# Patient Record
Sex: MALE | Race: WHITE | NOT HISPANIC OR LATINO | Employment: OTHER | URBAN - METROPOLITAN AREA
[De-identification: names, ages, dates, MRNs, and addresses within clinical notes are randomized per-mention and may not be internally consistent; named-entity substitution may affect disease eponyms.]

---

## 2022-07-25 DIAGNOSIS — Z12.11 SCREENING FOR COLON CANCER: Primary | ICD-10-CM

## 2022-09-12 RX ORDER — TAMSULOSIN HYDROCHLORIDE 0.4 MG/1
1 CAPSULE ORAL DAILY
COMMUNITY
Start: 2022-05-04

## 2022-09-12 RX ORDER — LISINOPRIL 20 MG/1
20 TABLET ORAL DAILY
COMMUNITY
Start: 2022-04-18

## 2022-09-12 RX ORDER — ESOMEPRAZOLE MAGNESIUM 40 MG/1
20 CAPSULE, DELAYED RELEASE ORAL
COMMUNITY

## 2022-09-12 RX ORDER — METFORMIN HYDROCHLORIDE 500 MG/1
TABLET ORAL
COMMUNITY
Start: 2022-06-26 | End: 2022-09-13

## 2022-09-12 RX ORDER — ATORVASTATIN CALCIUM 80 MG/1
80 TABLET, FILM COATED ORAL NIGHTLY
COMMUNITY
Start: 2022-04-13

## 2022-09-12 RX ORDER — ESCITALOPRAM OXALATE 10 MG/1
10 TABLET ORAL DAILY
COMMUNITY
Start: 2022-03-25

## 2022-09-12 NOTE — PROGRESS NOTES
Clinic Note    Reason for visit:  The primary encounter diagnosis was History of colon polyps. Diagnoses of Screening for colon cancer and Gastroesophageal reflux disease, unspecified whether esophagitis present were also pertinent to this visit.    PCP: Kevin [address] / [place] [id number]    HPI:  This is a 67 y.o. male who is here to establish care. Patient has h/o colon polyps and GERD, on esomeprazole 20 OTC daily, which controls reflux. Denies any dysphagia, diarrhea, constipation, abdominal pain, or blood in stool.     Patient had h/o renal cell carcinoma and had right kidney removed in 2022. In referral notes, it states patient's GFR in 30's.     Outside records reviewed:   Colonoscopy 9/30/2008 with Dr. Posada: polyp in sigmoid, repeat in 3 yr    Review of Systems   Constitutional:  Negative for chills, diaphoresis, fatigue, fever and unexpected weight change.   HENT:  Negative for hearing loss, mouth sores, nosebleeds, postnasal drip, sore throat, trouble swallowing and voice change.    Eyes:  Negative for pain, discharge and eye dryness.   Respiratory:  Negative for apnea, cough, choking, chest tightness, shortness of breath and wheezing.    Cardiovascular:  Negative for chest pain, palpitations, leg swelling and claudication.   Gastrointestinal:  Negative for abdominal distention, abdominal pain, anal bleeding, blood in stool, change in bowel habit, constipation, diarrhea, nausea, rectal pain, vomiting, reflux, fecal incontinence and change in bowel habit.   Genitourinary:  Negative for bladder incontinence, difficulty urinating, dysuria, flank pain, frequency and hematuria.   Musculoskeletal:  Positive for back pain. Negative for arthralgias, joint swelling and joint deformity.   Integumentary:  Negative for color change, rash and wound.   Allergic/Immunologic: Negative for environmental allergies and food allergies.   Neurological:  Negative for seizures, facial  asymmetry, speech difficulty, weakness, headaches and memory loss.   Hematological:  Negative for adenopathy. Does not bruise/bleed easily.   Psychiatric/Behavioral:  Negative for agitation, behavioral problems, confusion, hallucinations and sleep disturbance.       Past Medical History:   Diagnosis Date    Anxiety disorder, unspecified     CKD (chronic kidney disease) stage 3     Colon polyp     Essential (primary) hypertension     GERD (gastroesophageal reflux disease)     Mixed hyperlipidemia     Polycythemia     Renal cell carcinoma     s/p kidney removal 2022    Sleep apnea, unspecified     Type 2 diabetes mellitus without complications      Past Surgical History:   Procedure Laterality Date    COLONOSCOPY      NEPHRECTOMY Right     2022 due to cancer    TOTAL SHOULDER ARTHROPLASTY Left      Family History   Problem Relation Age of Onset    Breast cancer Mother     Heart attack Father      Social History     Tobacco Use    Smoking status: Former     Types: Cigarettes    Smokeless tobacco: Former     Types: Chew     Quit date: 2007   Substance Use Topics    Alcohol use: Not Currently    Drug use: Never     Review of patient's allergies indicates:  No Known Allergies   Medication List with Changes/Refills   New Medications    POLYETHYLENE GLYCOL (GOLYTELY) 236-22.74-6.74 -5.86 GRAM SUSPENSION    Take 4,000 mLs (4 L total) by mouth once. for 1 dose   Current Medications    ATORVASTATIN (LIPITOR) 80 MG TABLET    Take 80 mg by mouth every evening.    ESCITALOPRAM OXALATE (LEXAPRO) 10 MG TABLET    Take 10 mg by mouth once daily.    ESOMEPRAZOLE (NEXIUM) 40 MG CAPSULE    Take 20 mg by mouth before breakfast.    FAMOTIDINE (PEPCID) 20 MG TABLET    Take 20 mg by mouth 2 (two) times daily.    LISINOPRIL (PRINIVIL,ZESTRIL) 20 MG TABLET    Take 20 mg by mouth once daily.    TAMSULOSIN (FLOMAX) 0.4 MG CAP    Take 1 capsule by mouth once daily.   Discontinued Medications    METFORMIN (GLUCOPHAGE) 500 MG TABLET    TAKE 1  "TABLET BY MOUTH TWICE DAILY WITH MORNING AND EVENING MEAL         Vital Signs:  BP (!) 159/96   Pulse 65   Ht 5' 7" (1.702 m)   Wt 82.1 kg (181 lb)   SpO2 98%   BMI 28.35 kg/m²        Physical Exam  Constitutional:       General: He is not in acute distress.     Appearance: Normal appearance. He is well-developed. He is not ill-appearing or toxic-appearing.   HENT:      Head: Normocephalic and atraumatic.      Nose: Nose normal.      Mouth/Throat:      Mouth: Mucous membranes are moist.      Pharynx: Oropharynx is clear. No oropharyngeal exudate or posterior oropharyngeal erythema.   Eyes:      General: Lids are normal. No scleral icterus.        Right eye: No discharge.         Left eye: No discharge.      Extraocular Movements: Extraocular movements intact.      Conjunctiva/sclera: Conjunctivae normal.   Cardiovascular:      Rate and Rhythm: Normal rate and regular rhythm.      Pulses:           Radial pulses are 2+ on the right side and 2+ on the left side.   Pulmonary:      Effort: Pulmonary effort is normal. No respiratory distress.      Breath sounds: No stridor. No wheezing or rhonchi.   Abdominal:      General: Bowel sounds are normal. There is no distension.      Palpations: Abdomen is soft. There is no fluid wave, hepatomegaly, splenomegaly or mass.      Tenderness: There is no abdominal tenderness. There is no guarding or rebound.   Musculoskeletal:      Cervical back: Full passive range of motion without pain.      Right lower leg: No edema.      Left lower leg: No edema.   Lymphadenopathy:      Cervical: No cervical adenopathy.   Skin:     General: Skin is warm and dry.      Capillary Refill: Capillary refill takes less than 2 seconds.      Coloration: Skin is not cyanotic, jaundiced or pale.      Findings: No rash.   Neurological:      General: No focal deficit present.      Mental Status: He is alert and oriented to person, place, and time.   Psychiatric:         Mood and Affect: Mood normal.    "      Behavior: Behavior is cooperative.          All of the data above and below has been reviewed by myself and any further interpretations will be reflected in the assessment and plan.   The data includes review of external notes, and independent interpretation of lab results, procedures, x-rays, and imaging reports.      Assessment:  History of colon polyps  -     Ambulatory Referral to External Surgery    Screening for colon cancer  -     Ambulatory Referral to External Surgery  -     Ambulatory referral/consult to Gastroenterology    Gastroesophageal reflux disease, unspecified whether esophagitis present    Other orders  -     polyethylene glycol (GOLYTELY) 236-22.74-6.74 -5.86 gram suspension; Take 4,000 mLs (4 L total) by mouth once. for 1 dose  Dispense: 4000 mL; Refill: 0    GERD controlled with Nexium OTC.     Recommendations:  Schedule colonoscopy with Dr. Ji at [facility] [place] with [facility].     Risks, benefits, and alternatives of medical management, any associated procedures, and/or treatment discussed with the patient. Patient given opportunity to ask questions and voices understanding. Patient has elected to proceed with the recommended care modalities as discussed.    Follow up if symptoms worsen or fail to improve.    Order summary:  Orders Placed This Encounter   Procedures    Ambulatory Referral to External Surgery          Instructed patient to notify my office if they have not been contacted within two weeks after any procedures, submitting any samples (biopsies, blood, stool, urine, etc.) or after any imaging (X-ray, CT, MRI, etc.).      Michelle Back NP    This document may have been created using a voice recognition transcribing system. Incorrect words or phrases may have been missed during proofreading. Please interpret accordingly or contact me for clarification.

## 2022-09-13 ENCOUNTER — OFFICE VISIT (OUTPATIENT)
Dept: GASTROENTEROLOGY | Facility: CLINIC | Age: 67
End: 2022-09-13
Payer: MEDICARE

## 2022-09-13 VITALS
HEART RATE: 65 BPM | WEIGHT: 181 LBS | OXYGEN SATURATION: 98 % | HEIGHT: 67 IN | DIASTOLIC BLOOD PRESSURE: 96 MMHG | BODY MASS INDEX: 28.41 KG/M2 | SYSTOLIC BLOOD PRESSURE: 159 MMHG

## 2022-09-13 DIAGNOSIS — Z12.11 SCREENING FOR COLON CANCER: ICD-10-CM

## 2022-09-13 DIAGNOSIS — Z86.010 HISTORY OF COLON POLYPS: Primary | ICD-10-CM

## 2022-09-13 DIAGNOSIS — K21.9 GASTROESOPHAGEAL REFLUX DISEASE, UNSPECIFIED WHETHER ESOPHAGITIS PRESENT: ICD-10-CM

## 2022-09-13 PROCEDURE — 99203 PR OFFICE/OUTPT VISIT, NEW, LEVL III, 30-44 MIN: ICD-10-PCS | Mod: S$GLB,,,

## 2022-09-13 PROCEDURE — 99203 OFFICE O/P NEW LOW 30 MIN: CPT | Mod: S$GLB,,,

## 2022-09-13 RX ORDER — POLYETHYLENE GLYCOL 3350, SODIUM SULFATE ANHYDROUS, SODIUM BICARBONATE, SODIUM CHLORIDE, POTASSIUM CHLORIDE 236; 22.74; 6.74; 5.86; 2.97 G/4L; G/4L; G/4L; G/4L; G/4L
4 POWDER, FOR SOLUTION ORAL ONCE
Qty: 4000 ML | Refills: 0 | Status: SHIPPED | OUTPATIENT
Start: 2022-09-13 | End: 2022-09-13

## 2022-09-13 RX ORDER — FAMOTIDINE 20 MG/1
20 TABLET, FILM COATED ORAL 2 TIMES DAILY
COMMUNITY
End: 2022-09-13

## 2022-09-13 NOTE — PATIENT INSTRUCTIONS
Schedule colonoscopy with Dr. Ji.    Please notify my office if you have not been contacted within two weeks after any procedures, submitting any samples (biopsies, blood, stool, urine, etc.) or after any imaging (X-ray, CT, MRI, etc.).

## 2022-10-18 ENCOUNTER — OUTSIDE PLACE OF SERVICE (OUTPATIENT)
Dept: GASTROENTEROLOGY | Facility: CLINIC | Age: 67
End: 2022-10-18

## 2022-10-18 LAB — CRC RECOMMENDATION EXT: NORMAL

## 2022-10-18 PROCEDURE — G0105 COLORECTAL SCRN; HI RISK IND: HCPCS | Mod: ,,, | Performed by: INTERNAL MEDICINE

## 2022-10-18 PROCEDURE — G0105 COLORECTAL SCRN; HI RISK IND: ICD-10-PCS | Mod: ,,, | Performed by: INTERNAL MEDICINE

## 2022-12-02 ENCOUNTER — DOCUMENTATION ONLY (OUTPATIENT)
Dept: GASTROENTEROLOGY | Facility: CLINIC | Age: 67
End: 2022-12-02
Payer: MEDICARE